# Patient Record
Sex: FEMALE | Race: WHITE | NOT HISPANIC OR LATINO
[De-identification: names, ages, dates, MRNs, and addresses within clinical notes are randomized per-mention and may not be internally consistent; named-entity substitution may affect disease eponyms.]

---

## 2020-05-18 ENCOUNTER — TRANSCRIPTION ENCOUNTER (OUTPATIENT)
Age: 55
End: 2020-05-18

## 2020-10-12 ENCOUNTER — OUTPATIENT (OUTPATIENT)
Dept: OUTPATIENT SERVICES | Facility: HOSPITAL | Age: 55
LOS: 1 days | End: 2020-10-12
Payer: MEDICAID

## 2020-10-12 VITALS
RESPIRATION RATE: 14 BRPM | TEMPERATURE: 98 F | SYSTOLIC BLOOD PRESSURE: 144 MMHG | OXYGEN SATURATION: 100 % | HEART RATE: 78 BPM | HEIGHT: 65 IN | DIASTOLIC BLOOD PRESSURE: 87 MMHG | WEIGHT: 162.04 LBS

## 2020-10-12 DIAGNOSIS — Z98.890 OTHER SPECIFIED POSTPROCEDURAL STATES: Chronic | ICD-10-CM

## 2020-10-12 DIAGNOSIS — Z29.9 ENCOUNTER FOR PROPHYLACTIC MEASURES, UNSPECIFIED: ICD-10-CM

## 2020-10-12 DIAGNOSIS — Z85.3 PERSONAL HISTORY OF MALIGNANT NEOPLASM OF BREAST: ICD-10-CM

## 2020-10-12 DIAGNOSIS — Z90.13 ACQUIRED ABSENCE OF BILATERAL BREASTS AND NIPPLES: ICD-10-CM

## 2020-10-12 DIAGNOSIS — Z01.818 ENCOUNTER FOR OTHER PREPROCEDURAL EXAMINATION: ICD-10-CM

## 2020-10-12 DIAGNOSIS — C50.919 MALIGNANT NEOPLASM OF UNSPECIFIED SITE OF UNSPECIFIED FEMALE BREAST: ICD-10-CM

## 2020-10-12 PROBLEM — Z00.00 ENCOUNTER FOR PREVENTIVE HEALTH EXAMINATION: Status: ACTIVE | Noted: 2020-10-12

## 2020-10-12 LAB
ANION GAP SERPL CALC-SCNC: 11 MMOL/L — SIGNIFICANT CHANGE UP (ref 5–17)
BUN SERPL-MCNC: 15 MG/DL — SIGNIFICANT CHANGE UP (ref 7–23)
CALCIUM SERPL-MCNC: 9.7 MG/DL — SIGNIFICANT CHANGE UP (ref 8.4–10.5)
CHLORIDE SERPL-SCNC: 105 MMOL/L — SIGNIFICANT CHANGE UP (ref 96–108)
CO2 SERPL-SCNC: 26 MMOL/L — SIGNIFICANT CHANGE UP (ref 22–31)
CREAT SERPL-MCNC: 0.5 MG/DL — SIGNIFICANT CHANGE UP (ref 0.5–1.3)
GLUCOSE SERPL-MCNC: 153 MG/DL — HIGH (ref 70–99)
HCT VFR BLD CALC: 40.7 % — SIGNIFICANT CHANGE UP (ref 34.5–45)
HGB BLD-MCNC: 12.3 G/DL — SIGNIFICANT CHANGE UP (ref 11.5–15.5)
MCHC RBC-ENTMCNC: 26.8 PG — LOW (ref 27–34)
MCHC RBC-ENTMCNC: 30.2 GM/DL — LOW (ref 32–36)
MCV RBC AUTO: 88.7 FL — SIGNIFICANT CHANGE UP (ref 80–100)
NRBC # BLD: 0 /100 WBCS — SIGNIFICANT CHANGE UP (ref 0–0)
PLATELET # BLD AUTO: 284 K/UL — SIGNIFICANT CHANGE UP (ref 150–400)
POTASSIUM SERPL-MCNC: 4.2 MMOL/L — SIGNIFICANT CHANGE UP (ref 3.5–5.3)
POTASSIUM SERPL-SCNC: 4.2 MMOL/L — SIGNIFICANT CHANGE UP (ref 3.5–5.3)
RBC # BLD: 4.59 M/UL — SIGNIFICANT CHANGE UP (ref 3.8–5.2)
RBC # FLD: 13.4 % — SIGNIFICANT CHANGE UP (ref 10.3–14.5)
SODIUM SERPL-SCNC: 142 MMOL/L — SIGNIFICANT CHANGE UP (ref 135–145)
WBC # BLD: 7.63 K/UL — SIGNIFICANT CHANGE UP (ref 3.8–10.5)
WBC # FLD AUTO: 7.63 K/UL — SIGNIFICANT CHANGE UP (ref 3.8–10.5)

## 2020-10-12 PROCEDURE — 80048 BASIC METABOLIC PNL TOTAL CA: CPT

## 2020-10-12 PROCEDURE — G0463: CPT

## 2020-10-12 PROCEDURE — 85027 COMPLETE CBC AUTOMATED: CPT

## 2020-10-12 NOTE — H&P PST ADULT - ATTENDING COMMENTS
The patient is a 55 year old female who was recently diagnosed with right breast invasive lobular carcinoma. She presents to undergo a right nipple areolar sparing total mastectomy, right axillary sentinel lymph node biopsy, possible right axillary lymph node dissection and reconstruction.

## 2020-10-12 NOTE — H&P PST ADULT - NSICDXPROBLEM_GEN_ALL_CORE_FT
PROBLEM DIAGNOSES  Problem: Primary malignant neoplasm of breast  Assessment and Plan: Scheduled for right breast mastectomy and right tissue expander, direct to implant, dermal matrix  Chlorhexidine to affected area preop  Preop instructions given.  Labs pending.  COVID testing pending.    Problem: Need for prophylactic measure  Assessment and Plan: The Caprini score indicates this patient is at risk for a VTE event (score 3-5).  Most surgical patients in this group would benefit from pharmacologic prophylaxis.  The surgical team will determine the balance between VTE risk and bleeding risk

## 2020-10-12 NOTE — H&P PST ADULT - HISTORY OF PRESENT ILLNESS
Mrs. Paz is a 55 year old woman with no significant medical history who was dx with right breast cancer after a mammogram done in 8/2020 now scheduled for right breast mastectomy and right tissue expander, direct to implant, derma matrix.      COVID test for 10/13/2020 at [facility] Mrs. Paz is a 55 year old woman with no significant medical history who was dx with right breast cancer after a mammogram done in 8/2020 now scheduled for right breast mastectomy and right tissue expander, direct to implant, derma matrix.      COVID test for 10/13/2020 at [facility]    ****Addendum on 11/10/20 - pt. tested + for COVID-19 on 10/13/20 Mrs. Paz is a 55 year old woman with no significant medical history who was dx with right breast cancer after a mammogram done in 8/2020 now scheduled for right breast mastectomy and right tissue expander, direct to implant, derma matrix.      COVID test for 10/13/2020 at [facility]    ****Addendum on 11/10/20 - pt. tested + for COVID-19 on 10/13/20 pre-op - pt. asymptomatic, Got tested again on 10/21 and 11/3 - both times negative for SARS CoV-2, COVID-19 antibodies also negative - attached to chart.    Pt. presents to PST today for COVID-19 testing for surgery re-scheduled for 11/13/20 - Denies fever, chills, SOB, chest pain, changes in bowel/urinary habits. VS: 144/89, HR 75, 98%, Temp 98.6F    Case d/w Dr. Santana

## 2020-10-12 NOTE — H&P PST ADULT - NSANTHOSAYNRD_GEN_A_CORE
No. JV screening performed.  STOP BANG Legend: 0-2 = LOW Risk; 3-4 = INTERMEDIATE Risk; 5-8 = HIGH Risk

## 2020-10-13 ENCOUNTER — OUTPATIENT (OUTPATIENT)
Dept: OUTPATIENT SERVICES | Facility: HOSPITAL | Age: 55
LOS: 1 days | End: 2020-10-13
Payer: MEDICAID

## 2020-10-13 DIAGNOSIS — Z98.890 OTHER SPECIFIED POSTPROCEDURAL STATES: Chronic | ICD-10-CM

## 2020-10-13 DIAGNOSIS — Z11.59 ENCOUNTER FOR SCREENING FOR OTHER VIRAL DISEASES: ICD-10-CM

## 2020-10-13 LAB — SARS-COV-2 RNA SPEC QL NAA+PROBE: DETECTED

## 2020-10-13 PROCEDURE — U0003: CPT

## 2020-10-14 ENCOUNTER — RESULT REVIEW (OUTPATIENT)
Age: 55
End: 2020-10-14

## 2020-10-16 ENCOUNTER — APPOINTMENT (OUTPATIENT)
Dept: NUCLEAR MEDICINE | Facility: HOSPITAL | Age: 55
End: 2020-10-16

## 2020-10-29 PROBLEM — Z86.39 PERSONAL HISTORY OF OTHER ENDOCRINE, NUTRITIONAL AND METABOLIC DISEASE: Chronic | Status: ACTIVE | Noted: 2020-10-12

## 2020-10-31 NOTE — H&P PST ADULT - ASSESSMENT
1 CAPRINI SCORE [CLOT]    AGE RELATED RISK FACTORS                                                       MOBILITY RELATED FACTORS  [x ] Age 41-60 years                                            (1 Point)                  [ ] Bed rest                                                        (1 Point)  [ ] Age: 61-74 years                                           (2 Points)                 [ ] Plaster cast                                                   (2 Points)  [ ] Age= 75 years                                              (3 Points)                 [ ] Bed bound for more than 72 hours                 (2 Points)    DISEASE RELATED RISK FACTORS                                               GENDER SPECIFIC FACTORS  [ ] Edema in the lower extremities                       (1 Point)                  [ ] Pregnancy                                                     (1 Point)  [ ] Varicose veins                                               (1 Point)                  [ ] Post-partum < 6 weeks                                   (1 Point)             [x] BMI > 25 Kg/m2                                            (1 Point)                  [ ] Hormonal therapy  or oral contraception          (1 Point)                 [ ] Sepsis (in the previous month)                        (1 Point)                  [ ] History of pregnancy complications                 (1 point)  [ ] Pneumonia or serious lung disease                                               [ ] Unexplained or recurrent                     (1 Point)           (in the previous month)                               (1 Point)  [ ] Abnormal pulmonary function test                     (1 Point)                 SURGERY RELATED RISK FACTORS  [ ] Acute myocardial infarction                              (1 Point)                 [ ]  Section                                             (1 Point)  [ ] Congestive heart failure (in the previous month)  (1 Point)               [ ] Minor surgery                                                  (1 Point)   [ ] Inflammatory bowel disease                             (1 Point)                 [ ] Arthroscopic surgery                                        (2 Points)  [ ] Central venous access                                      (2 Points)                [x ] General surgery lasting more than 45 minutes   (2 Points)       [ ] Stroke (in the previous month)                          (5 Points)               [ ] Elective arthroplasty                                         (5 Points)                                                                                                                                               HEMATOLOGY RELATED FACTORS                                                 TRAUMA RELATED RISK FACTORS  [ ] Prior episodes of VTE                                     (3 Points)                 [ ] Fracture of the hip, pelvis, or leg                       (5 Points)  [ ] Positive family history for VTE                         (3 Points)                 [ ] Acute spinal cord injury (in the previous month)  (5 Points)  [ ] Prothrombin 13996 A                                     (3 Points)                 [ ] Paralysis  (less than 1 month)                             (5 Points)  [ ] Factor V Leiden                                             (3 Points)                  [ ] Multiple Trauma within 1 month                        (5 Points)  [ ] Lupus anticoagulants                                     (3 Points)                                                           [ ] Anticardiolipin antibodies                               (3 Points)                                                       [ ] High homocysteine in the blood                      (3 Points)                                             [ ] Other congenital or acquired thrombophilia      (3 Points)                                                [ ] Heparin induced thrombocytopenia                  (3 Points)                                          Total Score [     4     ]

## 2020-11-10 ENCOUNTER — OUTPATIENT (OUTPATIENT)
Dept: OUTPATIENT SERVICES | Facility: HOSPITAL | Age: 55
LOS: 1 days | End: 2020-11-10
Payer: MEDICAID

## 2020-11-10 DIAGNOSIS — Z90.13 ACQUIRED ABSENCE OF BILATERAL BREASTS AND NIPPLES: ICD-10-CM

## 2020-11-10 DIAGNOSIS — Z98.890 OTHER SPECIFIED POSTPROCEDURAL STATES: Chronic | ICD-10-CM

## 2020-11-10 DIAGNOSIS — C50.411 MALIGNANT NEOPLASM OF UPPER-OUTER QUADRANT OF RIGHT FEMALE BREAST: ICD-10-CM

## 2020-11-10 DIAGNOSIS — Z85.3 PERSONAL HISTORY OF MALIGNANT NEOPLASM OF BREAST: ICD-10-CM

## 2020-11-10 DIAGNOSIS — Z01.818 ENCOUNTER FOR OTHER PREPROCEDURAL EXAMINATION: ICD-10-CM

## 2020-11-10 LAB — SARS-COV-2 RNA SPEC QL NAA+PROBE: SIGNIFICANT CHANGE UP

## 2020-11-10 PROCEDURE — U0003: CPT

## 2020-11-10 PROCEDURE — G0463: CPT

## 2020-11-10 RX ORDER — SODIUM CHLORIDE 9 MG/ML
3 INJECTION INTRAMUSCULAR; INTRAVENOUS; SUBCUTANEOUS EVERY 8 HOURS
Refills: 0 | Status: DISCONTINUED | OUTPATIENT
Start: 2020-11-13 | End: 2020-11-13

## 2020-11-10 RX ORDER — LIDOCAINE HCL 20 MG/ML
0.2 VIAL (ML) INJECTION ONCE
Refills: 0 | Status: DISCONTINUED | OUTPATIENT
Start: 2020-11-13 | End: 2020-11-13

## 2020-11-10 RX ORDER — CHLORHEXIDINE GLUCONATE 213 G/1000ML
1 SOLUTION TOPICAL ONCE
Refills: 0 | Status: DISCONTINUED | OUTPATIENT
Start: 2020-11-13 | End: 2020-11-13

## 2020-11-10 RX ORDER — CEFAZOLIN SODIUM 1 G
2000 VIAL (EA) INJECTION ONCE
Refills: 0 | Status: DISCONTINUED | OUTPATIENT
Start: 2020-11-13 | End: 2020-11-14

## 2020-11-12 ENCOUNTER — TRANSCRIPTION ENCOUNTER (OUTPATIENT)
Age: 55
End: 2020-11-12

## 2020-11-13 ENCOUNTER — RESULT REVIEW (OUTPATIENT)
Age: 55
End: 2020-11-13

## 2020-11-13 ENCOUNTER — APPOINTMENT (OUTPATIENT)
Dept: NUCLEAR MEDICINE | Facility: HOSPITAL | Age: 55
End: 2020-11-13

## 2020-11-13 ENCOUNTER — INPATIENT (INPATIENT)
Facility: HOSPITAL | Age: 55
LOS: 0 days | Discharge: HOME | DRG: 577 | End: 2020-11-14
Attending: SURGERY | Admitting: SURGERY
Payer: MEDICAID

## 2020-11-13 VITALS
HEIGHT: 65 IN | TEMPERATURE: 99 F | OXYGEN SATURATION: 100 % | WEIGHT: 162.04 LBS | DIASTOLIC BLOOD PRESSURE: 88 MMHG | SYSTOLIC BLOOD PRESSURE: 154 MMHG | HEART RATE: 66 BPM | RESPIRATION RATE: 18 BRPM

## 2020-11-13 DIAGNOSIS — Z85.3 PERSONAL HISTORY OF MALIGNANT NEOPLASM OF BREAST: ICD-10-CM

## 2020-11-13 DIAGNOSIS — Z90.13 ACQUIRED ABSENCE OF BILATERAL BREASTS AND NIPPLES: ICD-10-CM

## 2020-11-13 DIAGNOSIS — Z98.890 OTHER SPECIFIED POSTPROCEDURAL STATES: Chronic | ICD-10-CM

## 2020-11-13 LAB
BLD GP AB SCN SERPL QL: POSITIVE — SIGNIFICANT CHANGE UP
RH IG SCN BLD-IMP: POSITIVE — SIGNIFICANT CHANGE UP

## 2020-11-13 PROCEDURE — 88334 PATH CONSLTJ SURG CYTO XM EA: CPT | Mod: 26,59

## 2020-11-13 PROCEDURE — 86078 PHYS BLOOD BANK SERV REACTJ: CPT

## 2020-11-13 PROCEDURE — 88331 PATH CONSLTJ SURG 1 BLK 1SPC: CPT | Mod: 26

## 2020-11-13 PROCEDURE — 71045 X-RAY EXAM CHEST 1 VIEW: CPT | Mod: 26

## 2020-11-13 PROCEDURE — 88305 TISSUE EXAM BY PATHOLOGIST: CPT | Mod: 26

## 2020-11-13 PROCEDURE — 88342 IMHCHEM/IMCYTCHM 1ST ANTB: CPT | Mod: 26,59

## 2020-11-13 PROCEDURE — 88307 TISSUE EXAM BY PATHOLOGIST: CPT | Mod: 26

## 2020-11-13 RX ORDER — HYDROMORPHONE HYDROCHLORIDE 2 MG/ML
0.5 INJECTION INTRAMUSCULAR; INTRAVENOUS; SUBCUTANEOUS
Refills: 0 | Status: DISCONTINUED | OUTPATIENT
Start: 2020-11-13 | End: 2020-11-13

## 2020-11-13 RX ORDER — OXYCODONE HYDROCHLORIDE 5 MG/1
5 TABLET ORAL EVERY 4 HOURS
Refills: 0 | Status: DISCONTINUED | OUTPATIENT
Start: 2020-11-13 | End: 2020-11-14

## 2020-11-13 RX ORDER — OXYCODONE HYDROCHLORIDE 5 MG/1
5 TABLET ORAL EVERY 6 HOURS
Refills: 0 | Status: DISCONTINUED | OUTPATIENT
Start: 2020-11-13 | End: 2020-11-14

## 2020-11-13 RX ORDER — INFLUENZA VIRUS VACCINE 15; 15; 15; 15 UG/.5ML; UG/.5ML; UG/.5ML; UG/.5ML
0.5 SUSPENSION INTRAMUSCULAR ONCE
Refills: 0 | Status: DISCONTINUED | OUTPATIENT
Start: 2020-11-13 | End: 2020-11-14

## 2020-11-13 RX ORDER — CEFAZOLIN SODIUM 1 G
2000 VIAL (EA) INJECTION EVERY 8 HOURS
Refills: 0 | Status: DISCONTINUED | OUTPATIENT
Start: 2020-11-13 | End: 2020-11-14

## 2020-11-13 RX ORDER — ENOXAPARIN SODIUM 100 MG/ML
40 INJECTION SUBCUTANEOUS DAILY
Refills: 0 | Status: DISCONTINUED | OUTPATIENT
Start: 2020-11-14 | End: 2020-11-14

## 2020-11-13 RX ORDER — HYDROMORPHONE HYDROCHLORIDE 2 MG/ML
1 INJECTION INTRAMUSCULAR; INTRAVENOUS; SUBCUTANEOUS
Refills: 0 | Status: DISCONTINUED | OUTPATIENT
Start: 2020-11-13 | End: 2020-11-13

## 2020-11-13 RX ORDER — ACETAMINOPHEN 500 MG
975 TABLET ORAL EVERY 6 HOURS
Refills: 0 | Status: DISCONTINUED | OUTPATIENT
Start: 2020-11-13 | End: 2020-11-14

## 2020-11-13 RX ORDER — ONDANSETRON 8 MG/1
4 TABLET, FILM COATED ORAL ONCE
Refills: 0 | Status: COMPLETED | OUTPATIENT
Start: 2020-11-13 | End: 2020-11-13

## 2020-11-13 RX ADMIN — HYDROMORPHONE HYDROCHLORIDE 0.5 MILLIGRAM(S): 2 INJECTION INTRAMUSCULAR; INTRAVENOUS; SUBCUTANEOUS at 20:55

## 2020-11-13 RX ADMIN — HYDROMORPHONE HYDROCHLORIDE 0.5 MILLIGRAM(S): 2 INJECTION INTRAMUSCULAR; INTRAVENOUS; SUBCUTANEOUS at 21:16

## 2020-11-13 RX ADMIN — ONDANSETRON 4 MILLIGRAM(S): 8 TABLET, FILM COATED ORAL at 20:55

## 2020-11-13 NOTE — BRIEF OPERATIVE NOTE - NSICDXBRIEFPREOP_GEN_ALL_CORE_FT
PRE-OP DIAGNOSIS:  Carcinoma of breast upper outer quadrant, right 13-Nov-2020 18:06:09  Palleschi, Susan M  
PRE-OP DIAGNOSIS:  Carcinoma of breast upper outer quadrant, right 13-Nov-2020 18:06:09  Palleschi, Susan M

## 2020-11-13 NOTE — BRIEF OPERATIVE NOTE - NSICDXBRIEFPROCEDURE_GEN_ALL_CORE_FT
PROCEDURES:  Insertion, breast tissue expander 13-Nov-2020 20:19:09  Gm Newell  
PROCEDURES:  Right mastectomy with sentinel lymph node biopsy 13-Nov-2020 18:05:57  Palleschi, Susan M

## 2020-11-13 NOTE — BRIEF OPERATIVE NOTE - NSICDXBRIEFPOSTOP_GEN_ALL_CORE_FT
POST-OP DIAGNOSIS:  Carcinoma of breast upper outer quadrant, right 13-Nov-2020 18:06:19  Palleschi, Susan M  
POST-OP DIAGNOSIS:  Carcinoma of breast upper outer quadrant, right 13-Nov-2020 18:06:19  Palleschi, Susan M

## 2020-11-13 NOTE — PRE-OP CHECKLIST - 1.
emotional support provided to patient,  pre op instruction and orientation to procedure provided to patient

## 2020-11-13 NOTE — PRE-ANESTHESIA EVALUATION ADULT - NSANTHPMHFT_GEN_ALL_CORE
55F PMHx adrenal tumor s/p adrenalectomy in 1990, found to have right sided breast ca now for right mastectomy.

## 2020-11-13 NOTE — BRIEF OPERATIVE NOTE - OPERATION/FINDINGS
Placement of sub pec 650 cc TE with 300 cc fill and lower pole covered with Alloderm. Closed with 2 drains
frozen section of right axillary sentinel lymph nodes negative, await final pathology

## 2020-11-14 ENCOUNTER — TRANSCRIPTION ENCOUNTER (OUTPATIENT)
Age: 55
End: 2020-11-14

## 2020-11-14 VITALS — TEMPERATURE: 99 F

## 2020-11-14 DIAGNOSIS — Z00.00 ENCOUNTER FOR GENERAL ADULT MEDICAL EXAMINATION WITHOUT ABNORMAL FINDINGS: ICD-10-CM

## 2020-11-14 LAB
ANION GAP SERPL CALC-SCNC: 10 MMOL/L — SIGNIFICANT CHANGE UP (ref 5–17)
BUN SERPL-MCNC: 11 MG/DL — SIGNIFICANT CHANGE UP (ref 7–23)
CALCIUM SERPL-MCNC: 8.9 MG/DL — SIGNIFICANT CHANGE UP (ref 8.4–10.5)
CHLORIDE SERPL-SCNC: 100 MMOL/L — SIGNIFICANT CHANGE UP (ref 96–108)
CO2 SERPL-SCNC: 26 MMOL/L — SIGNIFICANT CHANGE UP (ref 22–31)
CREAT SERPL-MCNC: 0.55 MG/DL — SIGNIFICANT CHANGE UP (ref 0.5–1.3)
GLUCOSE SERPL-MCNC: 118 MG/DL — HIGH (ref 70–99)
HCT VFR BLD CALC: 39.7 % — SIGNIFICANT CHANGE UP (ref 34.5–45)
HGB BLD-MCNC: 12.1 G/DL — SIGNIFICANT CHANGE UP (ref 11.5–15.5)
MAGNESIUM SERPL-MCNC: 2.4 MG/DL — SIGNIFICANT CHANGE UP (ref 1.6–2.6)
MCHC RBC-ENTMCNC: 26.9 PG — LOW (ref 27–34)
MCHC RBC-ENTMCNC: 30.5 GM/DL — LOW (ref 32–36)
MCV RBC AUTO: 88.4 FL — SIGNIFICANT CHANGE UP (ref 80–100)
NRBC # BLD: 0 /100 WBCS — SIGNIFICANT CHANGE UP (ref 0–0)
PHOSPHATE SERPL-MCNC: 3.2 MG/DL — SIGNIFICANT CHANGE UP (ref 2.5–4.5)
PLATELET # BLD AUTO: 303 K/UL — SIGNIFICANT CHANGE UP (ref 150–400)
POTASSIUM SERPL-MCNC: 4.6 MMOL/L — SIGNIFICANT CHANGE UP (ref 3.5–5.3)
POTASSIUM SERPL-SCNC: 4.6 MMOL/L — SIGNIFICANT CHANGE UP (ref 3.5–5.3)
RBC # BLD: 4.49 M/UL — SIGNIFICANT CHANGE UP (ref 3.8–5.2)
RBC # FLD: 13.4 % — SIGNIFICANT CHANGE UP (ref 10.3–14.5)
SODIUM SERPL-SCNC: 136 MMOL/L — SIGNIFICANT CHANGE UP (ref 135–145)
WBC # BLD: 11.82 K/UL — HIGH (ref 3.8–10.5)
WBC # FLD AUTO: 11.82 K/UL — HIGH (ref 3.8–10.5)

## 2020-11-14 PROCEDURE — 85027 COMPLETE CBC AUTOMATED: CPT

## 2020-11-14 PROCEDURE — A9541: CPT

## 2020-11-14 PROCEDURE — 86880 COOMBS TEST DIRECT: CPT

## 2020-11-14 PROCEDURE — 86900 BLOOD TYPING SEROLOGIC ABO: CPT

## 2020-11-14 PROCEDURE — 99261: CPT

## 2020-11-14 PROCEDURE — 86922 COMPATIBILITY TEST ANTIGLOB: CPT

## 2020-11-14 PROCEDURE — 71045 X-RAY EXAM CHEST 1 VIEW: CPT

## 2020-11-14 PROCEDURE — 86870 RBC ANTIBODY IDENTIFICATION: CPT

## 2020-11-14 PROCEDURE — C1789: CPT

## 2020-11-14 PROCEDURE — 88307 TISSUE EXAM BY PATHOLOGIST: CPT

## 2020-11-14 PROCEDURE — 80048 BASIC METABOLIC PNL TOTAL CA: CPT

## 2020-11-14 PROCEDURE — 88342 IMHCHEM/IMCYTCHM 1ST ANTB: CPT

## 2020-11-14 PROCEDURE — 86901 BLOOD TYPING SEROLOGIC RH(D): CPT

## 2020-11-14 PROCEDURE — 84100 ASSAY OF PHOSPHORUS: CPT

## 2020-11-14 PROCEDURE — 88305 TISSUE EXAM BY PATHOLOGIST: CPT

## 2020-11-14 PROCEDURE — 88331 PATH CONSLTJ SURG 1 BLK 1SPC: CPT

## 2020-11-14 PROCEDURE — 88334 PATH CONSLTJ SURG CYTO XM EA: CPT

## 2020-11-14 PROCEDURE — 86905 BLOOD TYPING RBC ANTIGENS: CPT

## 2020-11-14 PROCEDURE — 86850 RBC ANTIBODY SCREEN: CPT

## 2020-11-14 PROCEDURE — 83735 ASSAY OF MAGNESIUM: CPT

## 2020-11-14 PROCEDURE — C9399: CPT

## 2020-11-14 RX ORDER — OXYCODONE HYDROCHLORIDE 5 MG/1
1 TABLET ORAL
Qty: 12 | Refills: 0
Start: 2020-11-14 | End: 2020-11-16

## 2020-11-14 RX ADMIN — Medication 975 MILLIGRAM(S): at 17:50

## 2020-11-14 RX ADMIN — Medication 100 MILLIGRAM(S): at 02:51

## 2020-11-14 RX ADMIN — OXYCODONE HYDROCHLORIDE 5 MILLIGRAM(S): 5 TABLET ORAL at 17:50

## 2020-11-14 RX ADMIN — OXYCODONE HYDROCHLORIDE 5 MILLIGRAM(S): 5 TABLET ORAL at 09:39

## 2020-11-14 RX ADMIN — OXYCODONE HYDROCHLORIDE 5 MILLIGRAM(S): 5 TABLET ORAL at 02:00

## 2020-11-14 RX ADMIN — Medication 975 MILLIGRAM(S): at 17:55

## 2020-11-14 RX ADMIN — OXYCODONE HYDROCHLORIDE 5 MILLIGRAM(S): 5 TABLET ORAL at 01:32

## 2020-11-14 RX ADMIN — Medication 100 MILLIGRAM(S): at 11:56

## 2020-11-14 RX ADMIN — OXYCODONE HYDROCHLORIDE 5 MILLIGRAM(S): 5 TABLET ORAL at 17:55

## 2020-11-14 RX ADMIN — ENOXAPARIN SODIUM 40 MILLIGRAM(S): 100 INJECTION SUBCUTANEOUS at 11:56

## 2020-11-14 NOTE — PROGRESS NOTE ADULT - ASSESSMENT
A/P: 55 F POD#1 s/p Right NSM, R SLN biopsy, R submuscular TE placement doing well   -Cont diet   -OOB   -Cont drains, needs drain teaching prior to DC   -Record output at home twice per day   -Pain control   -Keep Bra on at all times, do not get wet   -Can sponge bathe other parts of body  -Follow up with Dr. Gomez next week   -Ok for DC from PRS standpoint

## 2020-11-14 NOTE — DISCHARGE NOTE PROVIDER - NSDCFUADDINST_GEN_ALL_CORE_FT
You will keep your drains until follow up with Dr. Gomez. Please drain and record output, as this will determine whether or not they can be removed.    Keep Bra on at all times until follow up. Do not get wet. You can sponge bathe other parts of your body, but do not shower or bathe.    Do not do any heavy lifting. Do not raise your right arm above your head or lift more than 5 pounds.    PAIN: You may continue to take Acetaminophen (Tylenol) and Ibuprofen (Advil, Motrin) over the counter for pain as needed. You can alternate the two medications, giving one every 3 hours. You were also prescribed a short course of oxycodone for pain. Please take this as prescribed only if tylenol and ibuprofen do not relieve your pain.    NOTIFY US IF: You have bleeding that does not stop, any pus draining from your wound(s), any fever (over 100.5 F) or chills, persistent nausea/vomiting, persistent diarrhea, or if your pain is not controlled on your discharge pain medications.     You will keep your drains until follow up with Dr. Gomez. Please drain and record output, as this will determine whether or not they can be removed.    Keep Bra on at all times until follow up. Do not get wet. You can sponge bathe other parts of your body, but do not shower or bathe.    Do not do any heavy lifting. Do not raise your right arm above your head or lift more than 5 pounds.    PAIN: You may continue to take Acetaminophen (Tylenol) and Ibuprofen (Advil, Motrin) over the counter for pain as needed. You can alternate the two medications, giving one every 3 hours. You were also prescribed a short course of oxycodone for pain. Please take this as prescribed only if tylenol and ibuprofen do not relieve your pain.    NOTIFY US IF: You have bleeding that does not stop, any pus draining from your wound(s), any fever (over 100.5 F) or chills, persistent nausea/vomiting, persistent diarrhea, or if your pain is not controlled on your discharge pain medications.

## 2020-11-14 NOTE — DISCHARGE NOTE PROVIDER - NSDCCPTREATMENT_GEN_ALL_CORE_FT
PRINCIPAL PROCEDURE  Procedure: Right mastectomy with sentinel lymph node biopsy  Findings and Treatment:       SECONDARY PROCEDURE  Procedure: Insertion, breast tissue expander  Findings and Treatment:     Procedure: Right mastectomy with sentinel lymph node biopsy  Findings and Treatment:

## 2020-11-14 NOTE — PROGRESS NOTE ADULT - SUBJECTIVE AND OBJECTIVE BOX
Surgery Post-Op Note    Pre-Op Dx: R breast carcinoma  Procedure: Right mastectomy with sentinel lymph node biopsy, Insertion, breast tissue expander by PRS  Surgeon: Dr. Palleschi, Dr. Ramires    SUBJECTIVE:  Pt seen and examined at the bedside. Pt w/ no complaints. Denies N/V, CP, SOB. Pain controlled with medication. Ambulated without issue. Bowel fxn -/-    OBJECTIVE:  Vital Signs Last 24 Hrs  T(C): 36.3 (13 Nov 2020 23:45), Max: 37 (13 Nov 2020 12:18)  T(F): 97.4 (13 Nov 2020 23:45), Max: 98.6 (13 Nov 2020 12:18)  HR: 87 (13 Nov 2020 23:45) (66 - 87)  BP: 135/81 (13 Nov 2020 23:45) (135/67 - 162/72)  BP(mean): 119 (13 Nov 2020 22:00) (95 - 119)  RR: 18 (13 Nov 2020 23:45) (14 - 18)  SpO2: 98% (13 Nov 2020 23:45) (98% - 100%)    Physical Exam:  General: NAD, resting comfortably in bed  Pulmonary: Nonlabored breathing, no respiratory distress  Chest: R breast soft, non-tender, no appreciable hematoma. Dermabond in place.  Abdominal: soft, NT, ND  Incision: C/D/I   Drains: 2x CHRISTINA   Extremities: WWP    LABS:  ABO Interpretation: O (11-13 @ 15:47)      ASSESSMENT:55y Female now 4hours s/p R breast mastectomy with tissue expanders. Recovering well on floor.    PLAN:  - Pain control: tylenol, oxy 5   - Encourage IS  - Nausea control PRN  - Monitor vitals  - Diet: Regular   - Monitor I+Os  - OOB/ Ambulate  - DVT ppx: lovenox    [facility]  [id number]

## 2020-11-14 NOTE — DISCHARGE NOTE PROVIDER - PROVIDER TOKENS
PROVIDER:[TOKEN:[[id number]],FOLLOWUP:[1 week],ESTABLISHEDPATIENT:[T]],PROVIDER:[TOKEN:[[id number]],FOLLOWUP:[2 weeks],ESTABLISHEDPATIENT:[T]]

## 2020-11-14 NOTE — DISCHARGE NOTE PROVIDER - HOSPITAL COURSE
Mrs. Paz is a 55 year old woman with no significant medical history who was dx with right breast cancer after a mammogram done in 8/2020 now scheduled for right breast mastectomy and right tissue expander, direct to implant, derma matrix. She went to the OR on 11/13 for right mastectomy with sentinal lymph node biopsy with Dr. Palleschi and placement of tissue expander with Dr. Gomez.    On post op check she was doing well with no complaints. On post op day 1 she was tolerating a diet, voiding, passing gas, and had pain well controlled on oral medications. She was ambulating and otherwise stable and felt comfortable with discharge.    At the time of discharge, the patient was hemodynamically stable, was tolerating PO diet, was voiding urine and passing stool, was ambulating, and was comfortable with adequate pain control. The patient was instructed to follow up with Dr. Palleschi within 1-2 weeks after discharge from the hospital. She will see Dr. Gomez within one week of discharge. The patient/family felt comfortable with discharge. The patient was discharged to home/rehab. The patient had no other issues.

## 2020-11-14 NOTE — DISCHARGE NOTE PROVIDER - CARE PROVIDER_API CALL
Cecil Gomez  PLASTIC SURGERY  [address], [address]  [place] [address]  Phone: [phone]  Fax: [phone]  Established Patient  Follow Up Time: 1 week    Palleschi, Susan M (MD)  Surgery  [address] [place] [address]  Phone: [phone]  Fax: [phone]  Established Patient  Follow Up Time: 2 weeks

## 2020-11-14 NOTE — DISCHARGE NOTE PROVIDER - NSDCCPCAREPLAN_GEN_ALL_CORE_FT
PRINCIPAL DISCHARGE DIAGNOSIS  Diagnosis: Breast cancer  Assessment and Plan of Treatment:        PRINCIPAL DISCHARGE DIAGNOSIS  Diagnosis: Breast cancer  Assessment and Plan of Treatment: Bathing: Do not shower or bathe until followup because this will affect the skin around your incisions and the drains. Keep the bra on and keep it dry. You can sponge bathe the other body parts.  Drains:  Both the breast and abdomen will have drains. It is important to empty the drains twice daily and record the outputs. Please bring this sheet to your appointment after surgery. Based on the output, the drains will be removed 1 to 3 weeks after surgery. For the drains to be working appropriately, the bulbs need to be collapsed to create a light suction. The nurse in the hospital will review the drain care with you and your family prior to discharge home. It is best to safely secure the drains to your clothes with a safety pin.  In an effort to make you more comfortable with discharge home and to answer any questions you may have, these instructions are for you. However, you may call the office at at any time with additional questions or concerns.  Call the Office:  Do not hesitate to call the office with any concerns or questions. A doctor is available to answer your questions 24 hours a day.   Please notify us if:  1. You have increased swelling, pain, or color change in the breast.  2. One breast becomes suddenly significantly larger than the other breast.  3. You have a sudden increase in swelling of the abdomen.  4. You have redness develop around the incisions.  5. You have a fever greater than 101 F.  6. You develop sudden increase in pain.  7. You develop drainage, spreading redness or foul odor  8. You have any questions.  Postoperative Pain Management:  Take ibuprofen and tylenol as needed every 6 hours for pain. A prescription was sent to your pharmacy, but you can also get these medications over the counter. If these two medications do not relieve your pain, a short course of oxycodone was prescribed and sent to your pharmacy. You do not have to fill the prescription if your pain is well controlled with ibuprofen and tylenol.

## 2020-11-14 NOTE — PROGRESS NOTE ADULT - SUBJECTIVE AND OBJECTIVE BOX
S: Doing well this AM.  Minor complaints of pain. Voided multiple times  No acute events     AVSS     R Drains: 1- 70ml (24 hr) 2- 140 (24hr)     O: NAD   Right breast incision line clean and dry without evidence of erythema or drainage   Drains are SS

## 2020-11-14 NOTE — DISCHARGE NOTE NURSING/CASE MANAGEMENT/SOCIAL WORK - PATIENT PORTAL LINK FT
You can access the FollowMyHealth Patient Portal offered by [facility] by registering at the following website: [email or web address]. By joining [employer]’s FollowMyHealth portal, you will also be able to view your health information using other applications (apps) compatible with our system.

## 2020-11-14 NOTE — DISCHARGE NOTE PROVIDER - NSDCMRMEDTOKEN_GEN_ALL_CORE_FT
oxyCODONE 5 mg oral tablet: 1 tab(s) orally 4 times a day MDD:4 tabs  Vitamin D3 50,000 intl units (1250 mcg) oral capsule: 1 tab(s) orally every 7 days

## 2020-11-14 NOTE — CHART NOTE - NSCHARTNOTEFT_GEN_A_CORE
R1 Event Note    Patient seen and examined at bedside for 100.6. Afebrile, wound incisions are c/d/i with not erythema or induration, no fluctuance or sign of infection. CHRISTINA drain with serosang output.     T(C): 38.1 (11-14-20 @ 17:12), Max: 38.1 (11-14-20 @ 17:12)  HR: 89 (11-14-20 @ 17:12) (67 - 89)  BP: 134/76 (11-14-20 @ 17:12) (110/68 - 162/72)  RR: 18 (11-14-20 @ 17:12) (14 - 18)  SpO2: 97% (11-14-20 @ 17:12) (95% - 100%)    A/P:   POD1 right partial mastectomy slnbx and implant, most likely post op atelectasis, no sign of infection. No crepitus.     Patient still okay to go home, given return precautions, felt comfortable with plan, will have close follow up outpatient.    Bryan, PGY-1  [id number]

## 2020-11-24 LAB — SURGICAL PATHOLOGY STUDY: SIGNIFICANT CHANGE UP

## 2021-01-19 ENCOUNTER — OUTPATIENT (OUTPATIENT)
Dept: OUTPATIENT SERVICES | Facility: HOSPITAL | Age: 56
LOS: 1 days | End: 2021-01-19
Payer: MEDICAID

## 2021-01-19 DIAGNOSIS — Z98.890 OTHER SPECIFIED POSTPROCEDURAL STATES: Chronic | ICD-10-CM

## 2021-01-19 DIAGNOSIS — C50.411 MALIGNANT NEOPLASM OF UPPER-OUTER QUADRANT OF RIGHT FEMALE BREAST: ICD-10-CM

## 2021-01-19 DIAGNOSIS — Z00.00 ENCOUNTER FOR GENERAL ADULT MEDICAL EXAMINATION WITHOUT ABNORMAL FINDINGS: ICD-10-CM

## 2021-01-19 PROCEDURE — 19000 PUNCTURE ASPIR CYST BREAST: CPT

## 2021-01-19 PROCEDURE — 76942 ECHO GUIDE FOR BIOPSY: CPT | Mod: 26

## 2021-01-19 PROCEDURE — 76942 ECHO GUIDE FOR BIOPSY: CPT

## 2021-01-19 PROCEDURE — 19000 PUNCTURE ASPIR CYST BREAST: CPT | Mod: RT

## 2021-01-27 ENCOUNTER — OUTPATIENT (OUTPATIENT)
Dept: OUTPATIENT SERVICES | Facility: HOSPITAL | Age: 56
LOS: 1 days | End: 2021-01-27
Payer: MEDICAID

## 2021-01-27 ENCOUNTER — TRANSCRIPTION ENCOUNTER (OUTPATIENT)
Age: 56
End: 2021-01-27

## 2021-01-27 VITALS
WEIGHT: 163.14 LBS | SYSTOLIC BLOOD PRESSURE: 136 MMHG | DIASTOLIC BLOOD PRESSURE: 88 MMHG | TEMPERATURE: 98 F | HEART RATE: 67 BPM | RESPIRATION RATE: 15 BRPM | OXYGEN SATURATION: 100 % | HEIGHT: 63 IN

## 2021-01-27 DIAGNOSIS — N65.1 DISPROPORTION OF RECONSTRUCTED BREAST: ICD-10-CM

## 2021-01-27 DIAGNOSIS — N65.0 DEFORMITY OF RECONSTRUCTED BREAST: ICD-10-CM

## 2021-01-27 DIAGNOSIS — Z90.11 ACQUIRED ABSENCE OF RIGHT BREAST AND NIPPLE: ICD-10-CM

## 2021-01-27 DIAGNOSIS — Z85.3 PERSONAL HISTORY OF MALIGNANT NEOPLASM OF BREAST: ICD-10-CM

## 2021-01-27 DIAGNOSIS — Z01.818 ENCOUNTER FOR OTHER PREPROCEDURAL EXAMINATION: ICD-10-CM

## 2021-01-27 DIAGNOSIS — Z98.890 OTHER SPECIFIED POSTPROCEDURAL STATES: Chronic | ICD-10-CM

## 2021-01-27 LAB
ALBUMIN SERPL ELPH-MCNC: 3.9 G/DL — SIGNIFICANT CHANGE UP (ref 3.3–5)
ALP SERPL-CCNC: 113 U/L — SIGNIFICANT CHANGE UP (ref 40–120)
ALT FLD-CCNC: 36 U/L — SIGNIFICANT CHANGE UP (ref 10–45)
ANION GAP SERPL CALC-SCNC: 6 MMOL/L — SIGNIFICANT CHANGE UP (ref 5–17)
AST SERPL-CCNC: 26 U/L — SIGNIFICANT CHANGE UP (ref 10–40)
BILIRUB SERPL-MCNC: 0.5 MG/DL — SIGNIFICANT CHANGE UP (ref 0.2–1.2)
BUN SERPL-MCNC: 20 MG/DL — SIGNIFICANT CHANGE UP (ref 7–23)
CALCIUM SERPL-MCNC: 9.2 MG/DL — SIGNIFICANT CHANGE UP (ref 8.4–10.5)
CHLORIDE SERPL-SCNC: 103 MMOL/L — SIGNIFICANT CHANGE UP (ref 96–108)
CO2 SERPL-SCNC: 30 MMOL/L — SIGNIFICANT CHANGE UP (ref 22–31)
CREAT SERPL-MCNC: 0.61 MG/DL — SIGNIFICANT CHANGE UP (ref 0.5–1.3)
GLUCOSE SERPL-MCNC: 93 MG/DL — SIGNIFICANT CHANGE UP (ref 70–99)
HCT VFR BLD CALC: 39.2 % — SIGNIFICANT CHANGE UP (ref 34.5–45)
HGB BLD-MCNC: 11.8 G/DL — SIGNIFICANT CHANGE UP (ref 11.5–15.5)
MCHC RBC-ENTMCNC: 25.6 PG — LOW (ref 27–34)
MCHC RBC-ENTMCNC: 30.1 GM/DL — LOW (ref 32–36)
MCV RBC AUTO: 85 FL — SIGNIFICANT CHANGE UP (ref 80–100)
NRBC # BLD: 0 /100 WBCS — SIGNIFICANT CHANGE UP (ref 0–0)
PLATELET # BLD AUTO: 280 K/UL — SIGNIFICANT CHANGE UP (ref 150–400)
POTASSIUM SERPL-MCNC: 3.9 MMOL/L — SIGNIFICANT CHANGE UP (ref 3.5–5.3)
POTASSIUM SERPL-SCNC: 3.9 MMOL/L — SIGNIFICANT CHANGE UP (ref 3.5–5.3)
PROT SERPL-MCNC: 8.1 G/DL — SIGNIFICANT CHANGE UP (ref 6–8.3)
RBC # BLD: 4.61 M/UL — SIGNIFICANT CHANGE UP (ref 3.8–5.2)
RBC # FLD: 13.8 % — SIGNIFICANT CHANGE UP (ref 10.3–14.5)
SARS-COV-2 RNA SPEC QL NAA+PROBE: SIGNIFICANT CHANGE UP
SODIUM SERPL-SCNC: 139 MMOL/L — SIGNIFICANT CHANGE UP (ref 135–145)
WBC # BLD: 6.4 K/UL — SIGNIFICANT CHANGE UP (ref 3.8–10.5)
WBC # FLD AUTO: 6.4 K/UL — SIGNIFICANT CHANGE UP (ref 3.8–10.5)

## 2021-01-27 PROCEDURE — 93010 ELECTROCARDIOGRAM REPORT: CPT | Mod: NC

## 2021-01-27 PROCEDURE — U0003: CPT

## 2021-01-27 PROCEDURE — G0463: CPT

## 2021-01-27 PROCEDURE — 80053 COMPREHEN METABOLIC PANEL: CPT

## 2021-01-27 PROCEDURE — 36415 COLL VENOUS BLD VENIPUNCTURE: CPT

## 2021-01-27 PROCEDURE — U0005: CPT

## 2021-01-27 PROCEDURE — 93005 ELECTROCARDIOGRAM TRACING: CPT

## 2021-01-27 PROCEDURE — 85027 COMPLETE CBC AUTOMATED: CPT

## 2021-01-27 NOTE — H&P PST ADULT - NSICDXPASTSURGICALHX_GEN_ALL_CORE_FT
PAST SURGICAL HISTORY:  History of adrenal surgery right side 1990     PAST SURGICAL HISTORY:  History of adrenal surgery right side 1990    S/P lumpectomy, right breast Oct 2020

## 2021-01-27 NOTE — H&P PST ADULT - ASSESSMENT
56 yo F for  right breast  removal expander  right  delayed breast implant      Labs pending   COVID swab 1/27/21

## 2021-01-28 ENCOUNTER — OUTPATIENT (OUTPATIENT)
Dept: OUTPATIENT SERVICES | Facility: HOSPITAL | Age: 56
LOS: 1 days | End: 2021-01-28
Payer: MEDICAID

## 2021-01-28 VITALS
SYSTOLIC BLOOD PRESSURE: 126 MMHG | OXYGEN SATURATION: 98 % | DIASTOLIC BLOOD PRESSURE: 68 MMHG | RESPIRATION RATE: 16 BRPM | TEMPERATURE: 98 F | HEART RATE: 68 BPM

## 2021-01-28 VITALS
RESPIRATION RATE: 16 BRPM | HEIGHT: 63 IN | TEMPERATURE: 97 F | HEART RATE: 70 BPM | DIASTOLIC BLOOD PRESSURE: 74 MMHG | WEIGHT: 163.14 LBS | OXYGEN SATURATION: 99 % | SYSTOLIC BLOOD PRESSURE: 131 MMHG

## 2021-01-28 DIAGNOSIS — N65.0 DEFORMITY OF RECONSTRUCTED BREAST: ICD-10-CM

## 2021-01-28 DIAGNOSIS — Z85.3 PERSONAL HISTORY OF MALIGNANT NEOPLASM OF BREAST: ICD-10-CM

## 2021-01-28 DIAGNOSIS — N65.1 DISPROPORTION OF RECONSTRUCTED BREAST: ICD-10-CM

## 2021-01-28 DIAGNOSIS — Z98.890 OTHER SPECIFIED POSTPROCEDURAL STATES: Chronic | ICD-10-CM

## 2021-01-28 DIAGNOSIS — Z90.11 ACQUIRED ABSENCE OF RIGHT BREAST AND NIPPLE: ICD-10-CM

## 2021-01-28 PROCEDURE — 19342 INSJ/RPLCMT BRST IMPLT SEP D: CPT | Mod: RT

## 2021-01-28 PROCEDURE — C1789: CPT

## 2021-01-28 PROCEDURE — 87070 CULTURE OTHR SPECIMN AEROBIC: CPT

## 2021-01-28 RX ORDER — HYDROMORPHONE HYDROCHLORIDE 2 MG/ML
1 INJECTION INTRAMUSCULAR; INTRAVENOUS; SUBCUTANEOUS
Refills: 0 | Status: DISCONTINUED | OUTPATIENT
Start: 2021-01-28 | End: 2021-01-28

## 2021-01-28 RX ORDER — SODIUM CHLORIDE 9 MG/ML
1000 INJECTION, SOLUTION INTRAVENOUS
Refills: 0 | Status: DISCONTINUED | OUTPATIENT
Start: 2021-01-28 | End: 2021-02-11

## 2021-01-28 RX ORDER — ONDANSETRON 8 MG/1
4 TABLET, FILM COATED ORAL ONCE
Refills: 0 | Status: DISCONTINUED | OUTPATIENT
Start: 2021-01-28 | End: 2021-01-28

## 2021-01-28 RX ORDER — SODIUM CHLORIDE 9 MG/ML
1000 INJECTION, SOLUTION INTRAVENOUS
Refills: 0 | Status: DISCONTINUED | OUTPATIENT
Start: 2021-01-28 | End: 2021-01-28

## 2021-01-28 RX ORDER — OXYCODONE HYDROCHLORIDE 5 MG/1
5 TABLET ORAL EVERY 4 HOURS
Refills: 0 | Status: DISCONTINUED | OUTPATIENT
Start: 2021-01-28 | End: 2021-01-28

## 2021-01-28 RX ORDER — HYDROMORPHONE HYDROCHLORIDE 2 MG/ML
0.5 INJECTION INTRAMUSCULAR; INTRAVENOUS; SUBCUTANEOUS
Refills: 0 | Status: DISCONTINUED | OUTPATIENT
Start: 2021-01-28 | End: 2021-01-28

## 2021-01-28 RX ORDER — CHOLECALCIFEROL (VITAMIN D3) 125 MCG
1 CAPSULE ORAL
Qty: 0 | Refills: 0 | DISCHARGE

## 2021-01-28 RX ADMIN — SODIUM CHLORIDE 50 MILLILITER(S): 9 INJECTION, SOLUTION INTRAVENOUS at 12:51

## 2021-01-28 NOTE — BRIEF OPERATIVE NOTE - NSICDXBRIEFPROCEDURE_GEN_ALL_CORE_FT
PROCEDURES:  Insertion, implant or tissue expander, breast, for reconstruction 28-Jan-2021 15:57:45  Boris Pacheco  Revision of tissue expander in right breast 28-Jan-2021 15:57:08  Boris Pacheco

## 2021-01-28 NOTE — ASU DISCHARGE PLAN (ADULT/PEDIATRIC) - CARE PROVIDER_API CALL
Cecil Gomez)  Plastic Surgery; Surgery  [address], [address]  [place] [address]  Phone: [phone]  Fax: [phone]  Follow Up Time: 1 week

## 2021-01-30 LAB
CULTURE RESULTS: NO GROWTH — SIGNIFICANT CHANGE UP
SPECIMEN SOURCE: SIGNIFICANT CHANGE UP

## 2021-02-10 NOTE — ASU DISCHARGE PLAN (ADULT/PEDIATRIC) - CALL YOUR DOCTOR IF YOU HAVE ANY OF THE FOLLOWING:
Bleeding that does not stop/Pain not relieved by Medications/Wound/Surgical Site with redness, or foul smelling discharge or pus not applicable 101/Bleeding that does not stop/Pain not relieved by Medications/Fever greater than (need to indicate Fahrenheit or Celsius)/Wound/Surgical Site with redness, or foul smelling discharge or pus/Nausea and vomiting that does not stop

## 2022-03-26 PROBLEM — C50.919 MALIGNANT NEOPLASM OF UNSPECIFIED SITE OF UNSPECIFIED FEMALE BREAST: Chronic | Status: ACTIVE | Noted: 2021-01-27

## 2022-05-02 ENCOUNTER — APPOINTMENT (OUTPATIENT)
Dept: ORTHOPEDIC SURGERY | Facility: CLINIC | Age: 57
End: 2022-05-02

## 2022-05-23 ENCOUNTER — APPOINTMENT (OUTPATIENT)
Dept: ORTHOPEDIC SURGERY | Facility: CLINIC | Age: 57
End: 2022-05-23
Payer: MEDICAID

## 2022-05-23 VITALS — HEIGHT: 65 IN | BODY MASS INDEX: 27.82 KG/M2 | WEIGHT: 167 LBS

## 2022-05-23 DIAGNOSIS — M75.82 OTHER SHOULDER LESIONS, LEFT SHOULDER: ICD-10-CM

## 2022-05-23 DIAGNOSIS — M76.892 OTHER SPECIFIED ENTHESOPATHIES OF LEFT LOWER LIMB, EXCLUDING FOOT: ICD-10-CM

## 2022-05-23 DIAGNOSIS — M75.42 IMPINGEMENT SYNDROME OF LEFT SHOULDER: ICD-10-CM

## 2022-05-23 PROCEDURE — 72100 X-RAY EXAM L-S SPINE 2/3 VWS: CPT

## 2022-06-29 NOTE — PHYSICAL EXAM
[Left] : left hip [FreeTextEntry3] : Left Shoulder: Inspection of the shoulder/upper arm is as follows: left shoulder: IR range is to lower thoracic. ER\par at 90 degrees is forearm vertical without impingement sign. NEERS test is negative. Adduction range is full. No internal\par impingement. Empty can strength is excellent. ER/IR strength is excellent. Active adduction is full with a sense of\par heaviness and difficulty compare to the right shoulder.

## 2022-06-29 NOTE — HISTORY OF PRESENT ILLNESS
[Lower back] : lower back [7] : 7 [6] : 6 [Dull/Aching] : dull/aching [Intermittent] : intermittent [Sleep] : sleep [Nothing helps with pain getting better] : Nothing helps with pain getting better [de-identified] : 05/23/2022: 6 week hx of pain above the left greater trochanter. [] : no [FreeTextEntry1] : Left shoulder [FreeTextEntry7] : Left Leg

## 2022-07-22 NOTE — ASU PATIENT PROFILE, ADULT - NS TRANSFER RESPONSE BELONGING
When to follow up: 8/26 Labs needed: 8/1, 8/15, 8/25 cmp cbc Images: 8/25 CT chest abd pelvis Chemotherapy Regimen:  Next Treatment Date Upcoming Treatment Dates - OP NSCLC GEMCITABINE Q2W 8/2/2022      gemcitabine (GEMZAR) 2,210 mg in sodium chloride 0.9% 250 mL chemo infusion 8/16/2022      gemcitabine (GEMZAR) 2,210 mg in sodium chloride 0.9% 250 mL chemo infusion 8/30/2022      gemcitabine (GEMZAR) 2,210 mg in sodium chloride 0.9% 250 mL chemo infusion  Provider: Rivas
yes

## 2023-07-11 ENCOUNTER — APPOINTMENT (OUTPATIENT)
Dept: ORTHOPEDIC SURGERY | Facility: CLINIC | Age: 58
End: 2023-07-11
Payer: MEDICAID

## 2023-07-11 VITALS — HEIGHT: 60 IN | BODY MASS INDEX: 32.2 KG/M2 | WEIGHT: 164 LBS

## 2023-07-11 DIAGNOSIS — M65.4 RADIAL STYLOID TENOSYNOVITIS [DE QUERVAIN]: ICD-10-CM

## 2023-07-11 PROCEDURE — 73110 X-RAY EXAM OF WRIST: CPT | Mod: RT

## 2023-07-11 PROCEDURE — 99214 OFFICE O/P EST MOD 30 MIN: CPT | Mod: 25

## 2023-07-11 PROCEDURE — 20551 NJX 1 TENDON ORIGIN/INSJ: CPT

## 2023-07-11 NOTE — PROCEDURE
[FreeTextEntry3] : Tendon sheath was performed of the right de quervain's. The indication for this procedure was pain and inflammation. The site was prepped with alcohol, betadine, ethyl chloride sprayed topically and sterile technique used. An injection of Lidocaine 1cc of 1% , Methylprednisolone (Depomedrol) .5cc of 40 mg  was used. \par Patient tolerated procedure well. Patient was advised to call if redness, pain or fever occur and apply ice for 15 minutes out of every hour for the next 12-24 hours as tolerated. \par \par Patient has tried OTC's including aspirin, Ibuprofen, Aleve, etc or prescription NSAIDS, and/or exercises at home and/or physical therapy without satisfactory response and the risks benefits, and alternatives have been discussed, and verbal consent was obtained.

## 2023-07-11 NOTE — IMAGING
[de-identified] : Right Hand Exam: There is a moderate localized swelling over the first dorsal compartment of the right wrist. She is maximally tender over the first dorsal compartment tendons where they emerge from the tunnel. Finkelstein test is mildly positive.

## 2023-07-11 NOTE — HISTORY OF PRESENT ILLNESS
[Gradual] : gradual [8] : 8 [4] : 4 [Burning] : burning [Dull/Aching] : dull/aching [Tingling] : tingling [Frequent] : frequent [Nothing helps with pain getting better] : Nothing helps with pain getting better [de-identified] : 07/11/2023: Right Wrist\par Pt reports that she has been experiencing pain in her right wrist, with numbness and tingling radiating into her right hand. Denies any accident or injury. \par ** CSI performed to de Quervain's tendon sheath **\par ** Excellent lidocaine response ** [] : no [FreeTextEntry1] : R hand  [FreeTextEntry5] : no injury  [de-identified] : activity

## 2023-07-11 NOTE — DISCUSSION/SUMMARY
[de-identified] : 1) I discussed the risks, benefits and alternatives of treatment options for the right wrist, including activity modification, rest, home exercise, oral antiinflammatory medication, CSI, observation.\par 2) ** CSI performed to de Quervain's tendon sheath.\par 3) Pt will continue with activity modification and home exercise as tolerated.  The patient should avoid exercise and activity that aggravates pain. \par \par \par The patient will continue with conservative treatment as described above, and will F/U in 3 weeks. \par \par \par The patient was advised of the diagnosis.  The natural history of the pathology was explained in full to the patient in layman's terms, including but not limited to the risks, symptoms and available options for treatment.  We discussed the risks, benefits and alternatives of the treatment options and the advice I provided to the patient as listed above.  Pt was given the opportunity to ask questions, and all questions were answered.  The discussion was not limited to the above.\par \par Entered by Giselle Manrique acting as scribe.

## 2023-08-01 ENCOUNTER — APPOINTMENT (OUTPATIENT)
Dept: ORTHOPEDIC SURGERY | Facility: CLINIC | Age: 58
End: 2023-08-01

## 2023-09-18 ENCOUNTER — APPOINTMENT (OUTPATIENT)
Dept: GYNECOLOGIC ONCOLOGY | Facility: CLINIC | Age: 58
End: 2023-09-18
Payer: MEDICAID

## 2023-09-18 VITALS
BODY MASS INDEX: 26.03 KG/M2 | HEART RATE: 71 BPM | HEIGHT: 66 IN | SYSTOLIC BLOOD PRESSURE: 154 MMHG | WEIGHT: 162 LBS | DIASTOLIC BLOOD PRESSURE: 101 MMHG

## 2023-09-18 DIAGNOSIS — Z78.9 OTHER SPECIFIED HEALTH STATUS: ICD-10-CM

## 2023-09-18 PROCEDURE — 99204 OFFICE O/P NEW MOD 45 MIN: CPT

## 2023-09-18 RX ORDER — ANASTROZOLE TABLETS 1 MG/1
TABLET ORAL
Refills: 0 | Status: ACTIVE | COMMUNITY

## 2023-09-21 ENCOUNTER — OUTPATIENT (OUTPATIENT)
Dept: OUTPATIENT SERVICES | Facility: HOSPITAL | Age: 58
LOS: 1 days | End: 2023-09-21
Payer: MEDICAID

## 2023-09-21 VITALS
OXYGEN SATURATION: 98 % | HEART RATE: 68 BPM | HEIGHT: 64 IN | WEIGHT: 158.95 LBS | RESPIRATION RATE: 16 BRPM | TEMPERATURE: 98 F | SYSTOLIC BLOOD PRESSURE: 156 MMHG | DIASTOLIC BLOOD PRESSURE: 84 MMHG

## 2023-09-21 DIAGNOSIS — Z98.890 OTHER SPECIFIED POSTPROCEDURAL STATES: Chronic | ICD-10-CM

## 2023-09-21 DIAGNOSIS — R03.0 ELEVATED BLOOD-PRESSURE READING, WITHOUT DIAGNOSIS OF HYPERTENSION: ICD-10-CM

## 2023-09-21 DIAGNOSIS — N83.209 UNSPECIFIED OVARIAN CYST, UNSPECIFIED SIDE: ICD-10-CM

## 2023-09-21 DIAGNOSIS — N83.299 OTHER OVARIAN CYST, UNSPECIFIED SIDE: ICD-10-CM

## 2023-09-21 LAB
A1C WITH ESTIMATED AVERAGE GLUCOSE RESULT: 5.7 % — HIGH (ref 4–5.6)
ALBUMIN SERPL ELPH-MCNC: 4.4 G/DL — SIGNIFICANT CHANGE UP (ref 3.3–5)
ALP SERPL-CCNC: 112 U/L — SIGNIFICANT CHANGE UP (ref 40–120)
ALT FLD-CCNC: 14 U/L — SIGNIFICANT CHANGE UP (ref 4–33)
ANION GAP SERPL CALC-SCNC: 10 MMOL/L — SIGNIFICANT CHANGE UP (ref 7–14)
AST SERPL-CCNC: 14 U/L — SIGNIFICANT CHANGE UP (ref 4–32)
BILIRUB SERPL-MCNC: 0.9 MG/DL — SIGNIFICANT CHANGE UP (ref 0.2–1.2)
BLD GP AB SCN SERPL QL: POSITIVE — SIGNIFICANT CHANGE UP
BUN SERPL-MCNC: 17 MG/DL — SIGNIFICANT CHANGE UP (ref 7–23)
CALCIUM SERPL-MCNC: 9.5 MG/DL — SIGNIFICANT CHANGE UP (ref 8.4–10.5)
CHLORIDE SERPL-SCNC: 103 MMOL/L — SIGNIFICANT CHANGE UP (ref 98–107)
CO2 SERPL-SCNC: 30 MMOL/L — SIGNIFICANT CHANGE UP (ref 22–31)
CREAT SERPL-MCNC: 0.59 MG/DL — SIGNIFICANT CHANGE UP (ref 0.5–1.3)
EGFR: 103 ML/MIN/1.73M2 — SIGNIFICANT CHANGE UP
ESTIMATED AVERAGE GLUCOSE: 117 — SIGNIFICANT CHANGE UP
GLUCOSE SERPL-MCNC: 120 MG/DL — HIGH (ref 70–99)
HCT VFR BLD CALC: 41.1 % — SIGNIFICANT CHANGE UP (ref 34.5–45)
HGB BLD-MCNC: 12.7 G/DL — SIGNIFICANT CHANGE UP (ref 11.5–15.5)
MCHC RBC-ENTMCNC: 26.5 PG — LOW (ref 27–34)
MCHC RBC-ENTMCNC: 30.9 GM/DL — LOW (ref 32–36)
MCV RBC AUTO: 85.8 FL — SIGNIFICANT CHANGE UP (ref 80–100)
NRBC # BLD: 0 /100 WBCS — SIGNIFICANT CHANGE UP (ref 0–0)
NRBC # FLD: 0 K/UL — SIGNIFICANT CHANGE UP (ref 0–0)
PLATELET # BLD AUTO: 263 K/UL — SIGNIFICANT CHANGE UP (ref 150–400)
POTASSIUM SERPL-MCNC: 4.2 MMOL/L — SIGNIFICANT CHANGE UP (ref 3.5–5.3)
POTASSIUM SERPL-SCNC: 4.2 MMOL/L — SIGNIFICANT CHANGE UP (ref 3.5–5.3)
PROT SERPL-MCNC: 7.8 G/DL — SIGNIFICANT CHANGE UP (ref 6–8.3)
RBC # BLD: 4.79 M/UL — SIGNIFICANT CHANGE UP (ref 3.8–5.2)
RBC # FLD: 13.6 % — SIGNIFICANT CHANGE UP (ref 10.3–14.5)
RH IG SCN BLD-IMP: POSITIVE — SIGNIFICANT CHANGE UP
SODIUM SERPL-SCNC: 143 MMOL/L — SIGNIFICANT CHANGE UP (ref 135–145)
WBC # BLD: 6.58 K/UL — SIGNIFICANT CHANGE UP (ref 3.8–10.5)
WBC # FLD AUTO: 6.58 K/UL — SIGNIFICANT CHANGE UP (ref 3.8–10.5)

## 2023-09-21 PROCEDURE — 93010 ELECTROCARDIOGRAM REPORT: CPT

## 2023-09-21 PROCEDURE — 86077 PHYS BLOOD BANK SERV XMATCH: CPT

## 2023-09-21 RX ORDER — CHLORHEXIDINE GLUCONATE 213 G/1000ML
1 SOLUTION TOPICAL ONCE
Refills: 0 | Status: COMPLETED | OUTPATIENT
Start: 2023-10-03 | End: 2023-10-03

## 2023-09-21 RX ORDER — IBUPROFEN 200 MG
2 TABLET ORAL
Qty: 0 | Refills: 0 | DISCHARGE

## 2023-09-21 RX ORDER — SODIUM CHLORIDE 9 MG/ML
1000 INJECTION, SOLUTION INTRAVENOUS
Refills: 0 | Status: DISCONTINUED | OUTPATIENT
Start: 2023-10-03 | End: 2023-10-18

## 2023-09-21 RX ORDER — LETROZOLE 2.5 MG/1
1 TABLET, FILM COATED ORAL
Qty: 0 | Refills: 0 | DISCHARGE

## 2023-09-21 NOTE — H&P PST ADULT - ASSESSMENT
Neurosurgical Spine Progress Note      Chief Complaint   Patient presents with   • Back Pain     Patient is here for lower back pain and neck pain       HPI:  Mike Turner is a 65 year old male who presents for new LBP.  Standing is the worse position.  Sitting improves.  He has LLE pain too.        Diagnosis:  Patient Active Problem List   Diagnosis   • Thumb pain, bilateral   • Mallet deformity of right ring finger   • Polyneuropathy   • Lumbar radiculopathy   • Polyradiculopathy   • Left carpal tunnel syndrome   • Allergic rhinitis   • Anxiety   • ASHD (arteriosclerotic heart disease)   • Breast mass   • Chronic back pain   • Colon polyps   • Elevated LFTs   • Encounter for screening for malignant neoplasm of colon   • Hyperlipidemia   • Lymphadenopathy   • Migraine   • Neoplasm of bone, soft tissue, and skin   • Other specified postprocedural states   • Right coronary artery occlusion (CMS/HCC)   • Temporomandibular joint disorder (TMJ)   • DDD (degenerative disc disease), lumbosacral   • Lumbosacral spondylosis without myelopathy   • DDD (degenerative disc disease), cervical   • Spinal stenosis of lumbar region with neurogenic claudication   • Pain management contract agreement   • S/P cervical spinal fusion   • Peripheral neuropathic pain   • Chronic pain syndrome   • Chronic left-sided low back pain   • Depression   • Sensory motor neuropathy   • Weakness of left lower extremity   • Numbness and tingling of left leg   • Pain of left lower extremity   • Neck pain       REVIEW OF SYSTEMS:  12 point review of systems completed and all negative, except as noted in HPI.    Symptoms are:   []  Constant  []  Comes and goes      Type of pain:  []  Sharp  []  Dull  []  Throbbing  []  Ache  []  Shooting  []  Burning  []  Numbness/Tingling             Interferes with:  []  Sitting  []  Standing  []  Bending  []  Walking  []  Sleeping  []  Work  []  Recreation             Treatments tried:  []  Medication  []   Exercise  []  Physical Therapy  []  Chiropractor  []  Acupuncture  []  Steroid Injections  []  Surgery     Patient has used pain medications to try to control the pain.  Pain management with corticosteroid injections has been performed.  The benefit of these interventions has been short-lived or provided minimal relief.    He has undergone physical therapy with some benefit.     Pain and discomfort continue to significantly and adversely impact quality of life by limiting activities of daily living.    Gait imbalance, bowel and bladder incontinence are denied.     Previous injuries:  no   Previous Spine Surgeries:  cervical  Most Recent Physical Therapy:  yes  Most Recent Pain Management:  yes      Past Medical History:   Diagnosis Date   • Anxiety 11/2/2020   • Cervical spondylosis    • DDD (degenerative disc disease), cervical 2/10/2022   • Depression 6/9/2022   • Failed moderate sedation during procedure     woke during colonoscopy   • Left carpal tunnel syndrome 12/10/2021   • Migraine 11/2/2020   • Myocardial infarction (CMS/[facility]) 2016   • Other chronic pain     right hand   • Pain management contract agreement 2/10/2022   • Peripheral neuropathic pain 2/10/2022   • Polyneuropathy 11/5/2018   • Polyradiculopathy 12/10/2021   • PONV (postoperative nausea and vomiting)    • S/P cervical spinal fusion 2/10/2022   • Spinal stenosis of lumbar region with neurogenic claudication 2/10/2022   • Temporomandibular joint disorder (TMJ) 11/2/2020     Past Surgical History:   Procedure Laterality Date   • Achilles tendon surgery      right, 2008   • Cervical fusion  12/28/2021    ACDF C4-7   • Colonoscopy     • Colonoscopy  03/24/2021   • Finger surgery Right 03/20/2014    thumb, excision of trapezium, arthroplasty flexor carpi radialis tendon   • Hand surgery      thumb surgery on left   • Knee arthroscopy w/ debridement      right   • Knee arthroscopy w/ debridement      left x2   • Lymph node biopsy     • Lymph node  biopsy      left axillary   • Spine surgery procedure unlisted  06/21/2021    MILD procedure at L4-5.     ALLERGIES:  No Known Allergies  Current Outpatient Medications   Medication Sig Dispense Refill   • [START ON 10/17/2022] gabapentin (NEURONTIN) 100 MG capsule Take 1 capsule by mouth every morning. Do not start before October 17, 2022. 30 capsule 1   • gabapentin (NEURONTIN) 300 MG capsule Take 2 capsules by mouth in the morning and 2 capsules in the evening. Do not start before October 10, 2022. 120 capsule 1   • [START ON 11/4/2022] tiZANidine (ZANAFLEX) 4 MG tablet Take 1 tablet by mouth 2 times daily as needed (pain). Do not start before November 4, 2022. 60 tablet 1   • atorvastatin (LIPITOR) 10 MG tablet Take 1 tablet by mouth daily. Needs fasting labs for further refills. 30 tablet 0   • loratadine-pseudoephedrine (Allergy Relief/Nasal Decongest)  MG per 24 hr tablet Take 1 tablet by mouth daily. 30 tablet 5   • pregabalin (LYRICA) 75 MG capsule Take 1 capsule (75 mg total) by mouth nightly. 90 capsule 1   • metoPROLOL succinate (Toprol XL) 25 MG 24 hr tablet Take 1 tablet by mouth daily. 90 tablet 2   • fluticasone (FLONASE) 50 MCG/ACT nasal spray Instill 2 sprays in each nostril daily. 48 g 5   • ondansetron (ZOFRAN) 4 MG tablet take 1 tablet by mouth every 8 hours as needed for nausea 30 tablet 0   • etodolac (LODINE) 300 MG capsule Take 1 capsule by mouth 2 times daily. (Patient taking differently: Take 300 mg by mouth in the morning and 300 mg in the evening. CANNOT HAVE FOR ABOUT 7 MORE WEEK) 180 capsule 1   • naLOXone (NARCAN) 4 MG/0.1ML nasal spray Spray the content of 1 device into 1 nostril. Call 911. May repeat with 2nd device in alternate nostril if no response in 2-3 minutes. 4 each 1   • COVID-19 mRNA, Pfizer, (Pfizer-Quip COVID-19 Vacc) 30 MCG/0.3ML vaccine Inject 0.3 mLs into the muscle. 0.3 mL 0   • vitamin B-12 (CYANOCOBALAMIN) 1000 MCG tablet Take 5,000 mcg by mouth daily.      • nitroGLYcerin (NITROSTAT) 0.4 MG sublingual tablet Place 1 tablet under the tongue every 5 minutes as needed for Chest pain. 25 tablet 2   • aspirin 81 MG tablet Take 1 tablet by mouth daily.     • etodolac (LODINE) 300 MG capsule Take 1 capsule by mouth 2 times daily. 180 capsule 1   • fluticasone (FLONASE) 50 MCG/ACT nasal spray Spray 2 sprays in each nostril daily. 48 g 6   • acetaminophen (TYLENOL) 325 MG tablet Take 650 mg by mouth every 4 hours as needed.     • Misc Natural Products (GLUCOSAMINE CHOND COMPLEX/MSM) TABS Take by mouth 2 times daily.      • Cholecalciferol (VITAMIN D PO) Take by mouth daily.        No current facility-administered medications for this visit.      Family History   Problem Relation Age of Onset   • Stroke Mother    • Stroke Father    • Hypertension Father    • Cancer Maternal Grandmother      Social History     Socioeconomic History   • Marital status: /Civil Union     Spouse name: Not on file   • Number of children: 0   • Years of education: Not on file   • Highest education level: Not on file   Occupational History   • Occupation: .   Tobacco Use   • Smoking status: Never Smoker   • Smokeless tobacco: Never Used   Vaping Use   • Vaping Use: never used   Substance and Sexual Activity   • Alcohol use: Yes     Comment: socially   • Drug use: No   • Sexual activity: Not on file   Other Topics Concern   • Not on file   Social History Narrative   • Not on file     Social Determinants of Health     Financial Resource Strain: Not on file   Food Insecurity: Not on file   Transportation Needs: Not on file   Physical Activity: Not on file   Stress: Not on file   Social Connections: Not on file   Intimate Partner Violence: Not At Risk   • Social Determinants: Intimate Partner Violence Past Fear: No   • Social Determinants: Intimate Partner Violence Current Fear: No       PHYSICAL EXAM:  Visit Vitals  Ht 5' 10\" (1.778 m)   Wt 77.6 kg (171 lb)   BMI 24.54 kg/m²      Awake, alert, fully oriented  EOMI  Pupils reactive  Face symmetric  Tongue midline  HEENT within normal limits, neck supple  Extremities with no cyanosis, clubbing or edema  Skin intact  Patient has 5/5 strength to deltoid, biceps, triceps, hand   5/5 strength to Iliopsoas, quadriceps, hamstrings, tibialis anterior, extensor hallucis longus and gastrocnemius/soleus  2/4 reflexes to biceps, brachioradialis, patellar and Achilles  No Robins  No clonus  Gait stable  Sensory stable       IMAGING  MRI:  I have personally reviewed the radiological images and report when available.    EXAM: MRI LUMBAR SPINE WO CONTRAST     DATE OF EXAM: 5/16/2022 5:18 PM.     COMPARISON: 12/7/2020      CLINICAL INFORMATION: Lumbar radiculopathy, > 6 wks, Low back pain,  progressive neurologic deficit.     FINDINGS: The lumbar vertebral bodies are normal height. There is minimal  retrolisthesis of L2 on 3.  Conus medullaris terminates at the L1 and is unremarkable.     T12-L1: Mild disc bulge. No spinal stenosis. Neural exit foramina are  patent.     L1-2: Mild diffuse disc bulge. No spinal stenosis. Neural exit foramina are  patent.     L2-3: Moderate diffuse disc bulge. Mild facet arthritic change. Mild spinal  stenosis. Narrowing of the neural exit foramina bilateral..     L3-4: Moderate diffuse disc bulge with far lateral post foraminal  herniation. Moderate facet arthritic change. Extensive spinal stenosis.  Neural exit foraminal stenosis bilateral right greater than left.     L4-5: Prominent disc bulge and posterior osteophyte formation. Mild facet  arthritic change. Mild spinal stenosis. Bilateral foraminal stenosis of the  left greater than right.     L5-S1: Diffuse disc bulge. Facet arthritic change. No spinal stenosis. The  lateral neural exit foraminal stenosis left greater than right.  Compared to the previous evaluations, Overall findings are similar.        IMPRESSION: Multilevel degenerative disc and arthritic  changes causing  spinal stenosis and neural exit foraminal stenosis at multiple levels as  detailed above    Encounter Diagnoses   Name Primary?   • DDD (degenerative disc disease), lumbosacral Yes   • S/P cervical spinal fusion    • Lumbosacral spondylosis without myelopathy      Patient seen in the office today for low back and leg pain.  He had that previously been seen in the office for cervical stenosis and he underwent a cervical decompression fusion in December.  He says the recovery is been quite slow and his arms and hands are still numb and tingly.  Is worse of this and standing.  It is of ulnar him to play golf.  His neurologic examination is intact.  Reviewed the MRI of his lumbar sacral spine and he stenosis at L4-5 level.  I recommended a bilateral L4-5 laminal foraminotomy.  He understands this is a outpatient procedure and will take several weeks to recover from including physical therapy.  After discussion of the risks and benefits he is eager to proceed.    Discussed with the patient were surgical and non-surgical management options with their potential risks and benefits for lumbar pathology.  Surgical risks discussed included, though not limited to, death, infection, intra or post-operative hemorrhage, spinal cord or nerve root injury with further neurologic impairment, spinal fluid leak, failure to improve, and potential need for further intervention at these or other levels.  Mr. Turner appeared to understand and requested that we proceed with scheduling Lumbar Laminoforaminotomy L4-5.    (Surgery CPTs:  54504 L-Laminectomy, 74872 Laminectomy addl level and 85867 Stereotactic Guidance)   Est Time:  3 hour(s) + 30 min setup at [facility], at patient's request.   Outpatient Status    All pre-operative labs, EKG, CXR to be obtained.  Withhold all anticoagulants and antiplatelet medications.   Refer back to [facility] team or primary care physician for preoperative evaluation.      Rogelio HERNANDEZ  MD Arleen, PhD, FAANS  Department of Neurosurgery  [facility]    T:  [phone]   F:  [phone]   M:  [phone]         Unspecified ovarian cyst , unspecified side

## 2023-09-21 NOTE — H&P PST ADULT - NSICDXPASTMEDICALHX_GEN_ALL_CORE_FT
PAST MEDICAL HISTORY:  Breast cancer Oct 2020    H/O vitamin D deficiency     Unspecified ovarian cyst, unspecified side

## 2023-09-21 NOTE — H&P PST ADULT - HISTORY OF PRESENT ILLNESS
58 year old with pre op dx of unspecified ovarian cyst , unspecified side is scheduled for laparoscopic bilateral salpingo oophorectomy , possible hysterectomy staging.

## 2023-09-21 NOTE — H&P PST ADULT - NSICDXPASTSURGICALHX_GEN_ALL_CORE_FT
PAST SURGICAL HISTORY:  History of adrenal surgery right side 1990    S/P lumpectomy, right breast Oct 2020

## 2023-09-21 NOTE — H&P PST ADULT - PROBLEM SELECTOR PLAN 1
Patient tentatively scheduled for laparoscopic bilateral salpingo oophorectomy , possible hysterectomy staging on 10/03/2023.    Pre-op instructions provided. Pt given verbal and written instructions with teach back on chlorhexidine wash  and pepcid. Pt verbalized understanding with return demonstration.    Labs done.

## 2023-09-21 NOTE — H&P PST ADULT - PROBLEM SELECTOR PLAN 2
Requesting medical  evaluation and EKG ( for comparison ) for elevated blood pressure reading at PST today.

## 2023-10-02 ENCOUNTER — TRANSCRIPTION ENCOUNTER (OUTPATIENT)
Age: 58
End: 2023-10-02

## 2023-10-02 NOTE — ASU PATIENT PROFILE, ADULT - VISION (WITH CORRECTIVE LENSES IF THE PATIENT USUALLY WEARS THEM):
Patient comes to clinic for follow up anticoagulation visit.   Last INR 9/18/20 was 1.9. Dose maintained per protocol.   Today's INR is 1.9 and is below goal range.    Current warfarin dosing verified with patient and wife. Patient was informed that today's INR result is below therapeutic range and instructed to increase current dose per protocol. Discussed return date for next INR in 2 weeks.  Patient will be flying to [place] on Monday and will have his INR done while in [place] with his physician. They will return possibly in 1 month.    Judy Loza is in the office today supervising the treatment.    Call your physician immediately if you notice any of the following symptoms of a blood clot:   · Sudden weakness in any limb  · Numbness or tingling anywhere  · Visual changes or loss of sight in either eye  · Sudden onset of slurred speech or inability to speak  · Dizziness or faintness  · New pain, swelling, redness or heat in any extremity  · New SOB or chest pain  Symptoms associated with blood clotting/low INR reviewed and verbalizes understanding.    Patient was instructed to contact the clinic with any unusual bleeding or bruising, any changes in medications, diet, health status, lifestyle, or any other changes, questions or concerns. Patient verbalized understanding of all discussed.       
Reviewed nurse's note and agree with Coumadin dosing.   
Normal vision: sees adequately in most situations; can see medication labels, newsprint

## 2023-10-02 NOTE — ASU PATIENT PROFILE, ADULT - FALL HARM RISK - UNIVERSAL INTERVENTIONS
Bed in lowest position, wheels locked, appropriate side rails in place/Call bell, personal items and telephone in reach/Instruct patient to call for assistance before getting out of bed or chair/Non-slip footwear when patient is out of bed/[place] to call system/Physically safe environment - no spills, clutter or unnecessary equipment/Purposeful Proactive Rounding/Room/bathroom lighting operational, light cord in reach

## 2023-10-03 ENCOUNTER — RESULT REVIEW (OUTPATIENT)
Age: 58
End: 2023-10-03

## 2023-10-03 ENCOUNTER — TRANSCRIPTION ENCOUNTER (OUTPATIENT)
Age: 58
End: 2023-10-03

## 2023-10-03 ENCOUNTER — OUTPATIENT (OUTPATIENT)
Dept: OUTPATIENT SERVICES | Facility: HOSPITAL | Age: 58
LOS: 1 days | Discharge: HOME | End: 2023-10-03
Payer: MEDICAID

## 2023-10-03 ENCOUNTER — APPOINTMENT (OUTPATIENT)
Dept: GYNECOLOGIC ONCOLOGY | Facility: HOSPITAL | Age: 58
End: 2023-10-03

## 2023-10-03 VITALS
TEMPERATURE: 98 F | HEART RATE: 78 BPM | SYSTOLIC BLOOD PRESSURE: 152 MMHG | OXYGEN SATURATION: 96 % | DIASTOLIC BLOOD PRESSURE: 67 MMHG | RESPIRATION RATE: 20 BRPM

## 2023-10-03 VITALS
WEIGHT: 158.95 LBS | HEIGHT: 64 IN | DIASTOLIC BLOOD PRESSURE: 81 MMHG | OXYGEN SATURATION: 100 % | SYSTOLIC BLOOD PRESSURE: 160 MMHG | TEMPERATURE: 98 F | HEART RATE: 65 BPM | RESPIRATION RATE: 16 BRPM

## 2023-10-03 DIAGNOSIS — Z98.890 OTHER SPECIFIED POSTPROCEDURAL STATES: Chronic | ICD-10-CM

## 2023-10-03 DIAGNOSIS — N83.209 UNSPECIFIED OVARIAN CYST, UNSPECIFIED SIDE: ICD-10-CM

## 2023-10-03 PROCEDURE — 88305 TISSUE EXAM BY PATHOLOGIST: CPT | Mod: 26

## 2023-10-03 PROCEDURE — 88112 CYTOPATH CELL ENHANCE TECH: CPT | Mod: 26

## 2023-10-03 PROCEDURE — 58661 LAPAROSCOPY REMOVE ADNEXA: CPT | Mod: 50,22

## 2023-10-03 PROCEDURE — 88341 IMHCHEM/IMCYTCHM EA ADD ANTB: CPT | Mod: 26

## 2023-10-03 PROCEDURE — 88342 IMHCHEM/IMCYTCHM 1ST ANTB: CPT | Mod: 26

## 2023-10-03 DEVICE — SURGICEL POWDER 3 GRAMS
Type: IMPLANTABLE DEVICE | Status: NON-FUNCTIONAL
Removed: 2023-10-03

## 2023-10-03 RX ORDER — IBUPROFEN 200 MG
3 TABLET ORAL
Qty: 0 | Refills: 0 | DISCHARGE

## 2023-10-03 RX ORDER — ONDANSETRON 8 MG/1
4 TABLET, FILM COATED ORAL ONCE
Refills: 0 | Status: DISCONTINUED | OUTPATIENT
Start: 2023-10-03 | End: 2023-10-18

## 2023-10-03 RX ORDER — ACETAMINOPHEN 500 MG
3 TABLET ORAL
Qty: 0 | Refills: 0 | DISCHARGE

## 2023-10-03 RX ORDER — ERGOCALCIFEROL 1.25 MG/1
0 CAPSULE ORAL
Refills: 0 | DISCHARGE

## 2023-10-03 RX ORDER — PREGABALIN 225 MG/1
0 CAPSULE ORAL
Refills: 0 | DISCHARGE

## 2023-10-03 RX ORDER — OXYCODONE HYDROCHLORIDE 5 MG/1
1 TABLET ORAL
Qty: 5 | Refills: 0
Start: 2023-10-03

## 2023-10-03 RX ORDER — FENTANYL CITRATE 50 UG/ML
25 INJECTION INTRAVENOUS
Refills: 0 | Status: DISCONTINUED | OUTPATIENT
Start: 2023-10-03 | End: 2023-10-03

## 2023-10-03 RX ORDER — ANASTROZOLE 1 MG/1
1 TABLET ORAL
Refills: 0 | DISCHARGE

## 2023-10-03 RX ADMIN — CHLORHEXIDINE GLUCONATE 1 APPLICATION(S): 213 SOLUTION TOPICAL at 14:07

## 2023-10-03 RX ADMIN — SODIUM CHLORIDE 30 MILLILITER(S): 9 INJECTION, SOLUTION INTRAVENOUS at 14:08

## 2023-10-03 NOTE — CHART NOTE - NSCHARTNOTEFT_GEN_A_CORE
R2 OBGYN POST-OP CHECK    S: Patient seen and evaluated at bedside.  Pt awake and alert resting comfortably in bed   Patient reports pain controlled with analgesia. Pt denies N/V, SOB, CP, palpitations, fever/chills. Tolerating clears.  +OOB. Voiding spontaneously    O:   T(C): 36.4 (10-03-23 @ 20:30), Max: 36.8 (10-03-23 @ 19:00)  HR: 77 (10-03-23 @ 20:40) (63 - 92)  BP: 158/77 (10-03-23 @ 20:40) (148/85 - 158/77)  RR: 23 (10-03-23 @ 20:40) (12 - 23)  SpO2: 99% (10-03-23 @ 20:40) (92% - 99%)  Wt(kg): --  I&O's Summary    03 Oct 2023 07:01  -  03 Oct 2023 20:45  --------------------------------------------------------  IN: 240 mL / OUT: 300 mL / NET: -60 mL        Gen: Resting comfortably in bed, NAD  Abd: soft, appropriately tender  Inc: port sites clean/dry/intact w/ op sites in place  Ext: SCD's in place and functional, non-tender b/l, no edema        A/P: 58y Female s/p LSC BSO    Neuro: PO Analgesia PRN    CV: Hemodynamically stable.  Monitor VS.  Pulm: Saturating well on room air.  Encourage OOB and incentive spirometer use.   GI: Advance to regular diet. Anti-emetics PRN.  : Voiding spontaneously  FEN: Electrolytes: LR@125cc/hr.   Heme: DVT ppx w/ SCD's while in bed. Early ambulation, initially with assistance then as tolerated.   ID: Afebrile  Endo: No active issues   Dispo: Discharge from PACU when criteria met.     Anya Jade, PGY2

## 2023-10-03 NOTE — ASU DISCHARGE PLAN (ADULT/PEDIATRIC) - CALL YOUR DOCTOR IF YOU HAVE ANY OF THE FOLLOWING:
Bleeding that does not stop/Fever greater than (need to indicate Fahrenheit or Celsius) Fever greater than (need to indicate Fahrenheit or Celsius)/Nausea and vomiting that does not stop/Inability to tolerate liquids or foods

## 2023-10-03 NOTE — ASU DISCHARGE PLAN (ADULT/PEDIATRIC) - NS MD DC FALL RISK RISK
For information on Fall & Injury Prevention, visit: [email or web address] OR  [email or web address] OR  https://www.cdc.gov/steadi/patient.html

## 2023-10-03 NOTE — BRIEF OPERATIVE NOTE - NSICDXBRIEFPROCEDURE_GEN_ALL_CORE_FT
PROCEDURES:  Laparoscopic BSO 03-Oct-2023 19:07:07  Jereen, Amyeo  Excision, mass, pelvic cavity, laparoscopic 03-Oct-2023 19:07:24  Jereen, Amyeo

## 2023-10-03 NOTE — ASU DISCHARGE PLAN (ADULT/PEDIATRIC) - CARE PROVIDER_API CALL
Laya Nguyen  Gynecologic Oncology  [address] [place] [id number]  Phone: [phone]  Fax: [phone]  Follow Up Time: 2 weeks

## 2023-10-03 NOTE — BRIEF OPERATIVE NOTE - OPERATION/FINDINGS
EUA - mobile uterus, 6wk anteverted.  LSC - R adnexa adhesed to pelvic sided wall and appendix, multiple omental adhesion, notably adhesive disease btwn cecum and R pelvic wall.

## 2023-10-04 ENCOUNTER — NON-APPOINTMENT (OUTPATIENT)
Age: 58
End: 2023-10-04

## 2023-10-04 LAB
GLUCOSE BLDC GLUCOMTR-MCNC: 82 MG/DL — SIGNIFICANT CHANGE UP (ref 70–99)
NON-GYNECOLOGICAL CYTOLOGY STUDY: SIGNIFICANT CHANGE UP

## 2023-10-18 PROBLEM — N83.209 OVARIAN CYST: Status: ACTIVE | Noted: 2023-09-18

## 2023-10-20 ENCOUNTER — APPOINTMENT (OUTPATIENT)
Dept: GYNECOLOGIC ONCOLOGY | Facility: CLINIC | Age: 58
End: 2023-10-20
Payer: MEDICAID

## 2023-10-20 VITALS
SYSTOLIC BLOOD PRESSURE: 152 MMHG | WEIGHT: 162 LBS | HEART RATE: 63 BPM | DIASTOLIC BLOOD PRESSURE: 83 MMHG | TEMPERATURE: 98 F | BODY MASS INDEX: 26.03 KG/M2 | HEIGHT: 66 IN

## 2023-10-20 DIAGNOSIS — N83.209 UNSPECIFIED OVARIAN CYST, UNSPECIFIED SIDE: ICD-10-CM

## 2023-10-20 PROCEDURE — 99212 OFFICE O/P EST SF 10 MIN: CPT

## 2023-10-27 LAB
SURGICAL PATHOLOGY STUDY: SIGNIFICANT CHANGE UP
SURGICAL PATHOLOGY STUDY: SIGNIFICANT CHANGE UP

## 2023-11-25 NOTE — PRE-OP CHECKLIST - LOOSE TEETH
Anesthesia Post Evaluation    Patient: Charley Mcgarry    Procedure(s) Performed: Procedure(s) (LRB):   SECTION (N/A)    Final Anesthesia Type: epidural      Patient location during evaluation: floor  Patient participation: Yes- Able to Participate  Level of consciousness: awake and alert, oriented and awake  Post-procedure vital signs: reviewed and stable  Pain management: adequate  Airway patency: patent    PONV status at discharge: No PONV  Anesthetic complications: no      Cardiovascular status: blood pressure returned to baseline, hemodynamically stable and stable  Respiratory status: unassisted, spontaneous ventilation and room air  Hydration status: euvolemic  Follow-up not needed.  Comments: The patient was found to be awake alert and oriented x4 without over sedation, confusion or respiratory depression at this time.  She states that her legs feel normal and was able to demonstrate good motor and sensory to her lower extremities at this time.  The patient is without headache or back ache at this time.  She denies nausea vomiting or pruritus at this time.  Patient reports adequate pain control to this point.  The epidural site was examined and found to be clean and dry without evidence of bleeding, infection or hematoma formation.  She was instructed to notify the Department of Anesthesiology with any questions, problems or concerns.  The patient demonstrates no anesthesia complications at this time.          Vitals Value Taken Time   BP 91/52 230   Temp 37.3 °C (99.1 °F) 23   Pulse 82 23   Resp 18 23   SpO2 96 % 23         Event Time   Out of Recovery 20:05:00         Pain/Gloria Score: Pain Rating Prior to Med Admin: 5 (2023  5:12 AM)  Pain Rating Post Med Admin: 0 (2023  2:30 AM)           no

## 2024-07-23 NOTE — PRE-OP CHECKLIST - NS PREOP CHK CHLOROHEX WASH
Medication Refill Request    Glenn Keyes is requesting a refill of the following medication(s):   Requested Prescriptions     Pending Prescriptions Disp Refills    venlafaxine (EFFEXOR XR) 37.5 MG extended release capsule [Pharmacy Med Name: VENLAFAXINE HCL ER 37.5 MG CAP] 90 capsule 1     Sig: TAKE 1 CAPSULE BY MOUTH DAILY. TAKE IN ADDITION TO EFFEXOR 150MG FOR TOTAL .5MG DAILY.        Listed PCP is Susan Lam MD   Last provider to prescribe medication: Dr. Lam  Last Date of Medication Prescribed: 11/01/2023   Date of Last Office Visit at [facility]: 04/30/2024     Future Appointment:   Future Appointments   Date Time Provider Department Center   7/24/2024  8:30 AM [facility] 1 [facility]       Please send refill to:    [id number] [place], [place] - [address] [place] [address] [phone] - F [phone]  [address] [place], [place] [id number]  Phone: [phone] Fax: [phone]    [facility]/pharmacy #[id number] [place], [place] - [address] [place] [address] -  [phone] - F [phone]  [address] [place] [address]  Phone: [phone] Fax: [phone]         in ASU:

## 2025-01-22 NOTE — H&P PST ADULT - NSANTHOSAYNRD_GEN_A_CORE
No. JV screening performed.  STOP BANG Legend: 0-2 = LOW Risk; 3-4 = INTERMEDIATE Risk; 5-8 = HIGH Risk
Current every day smoker

## 2025-08-12 ENCOUNTER — TRANSCRIPTION ENCOUNTER (OUTPATIENT)
Age: 60
End: 2025-08-12

## (undated) DEVICE — FOLEY TRAY 16FR 5CC LF UMETER CLOSED

## (undated) DEVICE — SOL IRR POUR NS 0.9% 500ML

## (undated) DEVICE — PACK PERI GYN

## (undated) DEVICE — PACK GENERAL LAPAROSCOPY

## (undated) DEVICE — TUBING OLYMPUS INSUFFLATION

## (undated) DEVICE — MEDICINE CUP WITH LID 60ML

## (undated) DEVICE — VENODYNE/SCD SLEEVE CALF MEDIUM

## (undated) DEVICE — CANISTER DISPOSABLE THIN WALL 3000CC

## (undated) DEVICE — POSITIONER PURPLE ARM ONE STEP (LARGE)

## (undated) DEVICE — ELCTR GROUNDING PAD ADULT COVIDIEN

## (undated) DEVICE — SUT VICRYL 0 27" UR-6

## (undated) DEVICE — ELCTR BOVIE PENCIL SMOKE EVACUATION

## (undated) DEVICE — TROCAR COVIDIEN BLUNT TIP HASSAN 10MM

## (undated) DEVICE — D HELP - CLEARVIEW CLEARIFY SYSTEM

## (undated) DEVICE — UTERINE MANIPULATOR COOPER SURGICAL 5MM 33CM GREEN

## (undated) DEVICE — TROCAR COVIDIEN VERSAPORT BLADELESS OPTICAL 5MM STANDARD

## (undated) DEVICE — SUT MONOCRYL 4-0 27" PS-2 UNDYED

## (undated) DEVICE — PACK D&C

## (undated) DEVICE — TUBING HYDRO-SURG PLUS IRRIGATOR W SMOKEVAC & PROBE

## (undated) DEVICE — WARMING BLANKET UPPER ADULT

## (undated) DEVICE — DRSG OPSITE 2.5 X 2"

## (undated) DEVICE — GOWN XL

## (undated) DEVICE — POSITIONER PINK PAD PIGAZZI SYSTEM

## (undated) DEVICE — GLV 6 PROTEXIS (BLUE)

## (undated) DEVICE — LIGASURE BLUNT TIP 37CM

## (undated) DEVICE — DRSG STERISTRIPS 0.5 X 4"

## (undated) DEVICE — ENDOCATCH 10MM SPECIMEN POUCH

## (undated) DEVICE — DRSG TELFA 3 X 8

## (undated) DEVICE — DRAPE 3/4 SHEET 52X76"

## (undated) DEVICE — APPLICATOR SURGICEL LAP TROCAR POINT 2.5MM X 150MM

## (undated) DEVICE — DRAPE INSTRUMENT POUCH 6.75" X 11"

## (undated) DEVICE — TIP METZENBAUM SCISSOR MONOPOLAR ENDOCUT (ORANGE)

## (undated) DEVICE — PREP BETADINE SPONGE STICKS

## (undated) DEVICE — BLADE SURGICAL #15 CARBON

## (undated) DEVICE — GLV 5.5 PROTEXIS (WHITE)